# Patient Record
Sex: FEMALE | Race: WHITE | NOT HISPANIC OR LATINO | Employment: OTHER | URBAN - METROPOLITAN AREA
[De-identification: names, ages, dates, MRNs, and addresses within clinical notes are randomized per-mention and may not be internally consistent; named-entity substitution may affect disease eponyms.]

---

## 2023-06-01 ENCOUNTER — TELEPHONE (OUTPATIENT)
Dept: PRIMARY CARE | Facility: CLINIC | Age: 83
End: 2023-06-01

## 2023-06-01 DIAGNOSIS — E05.90 HYPERTHYROIDISM: ICD-10-CM

## 2023-06-01 RX ORDER — LEVOTHYROXINE SODIUM 75 UG/1
1 TABLET ORAL DAILY
COMMUNITY
Start: 2013-08-05

## 2023-06-01 RX ORDER — LEVOTHYROXINE SODIUM 75 UG/1
75 TABLET ORAL DAILY
Status: CANCELLED | OUTPATIENT
Start: 2023-06-01

## 2023-10-06 ENCOUNTER — TELEPHONE (OUTPATIENT)
Dept: GERIATRIC MEDICINE | Facility: CLINIC | Age: 83
End: 2023-10-06

## 2023-10-06 NOTE — TELEPHONE ENCOUNTER
Son states there is no signs of improvement and he would like a call back to discuss. He states Pt was seen several weeks ago.

## 2023-10-09 ENCOUNTER — TELEPHONE (OUTPATIENT)
Dept: GERIATRIC MEDICINE | Facility: CLINIC | Age: 83
End: 2023-10-09

## 2023-10-13 ENCOUNTER — TELEPHONE (OUTPATIENT)
Dept: GERIATRIC MEDICINE | Facility: CLINIC | Age: 83
End: 2023-10-13

## 2023-10-13 NOTE — TELEPHONE ENCOUNTER
Son states there was a medication that was recommended at the last visit and he is unsure of the name. Requesting to have the script sent to [facility] and he states she has not been able to contact Dr. Sauer for a discussion and would like a call back.

## 2023-10-16 ENCOUNTER — TELEPHONE (OUTPATIENT)
Dept: GERIATRIC MEDICINE | Facility: CLINIC | Age: 83
End: 2023-10-16

## 2023-10-16 NOTE — TELEPHONE ENCOUNTER
Spoke with Pt's son and he is inquiring if seroquel was sent to the pharmacy. Son states Pt never picked up the script and per chart script for seroquel was sent to [facility]. Son to call the pharmacy and check the status .

## 2024-01-02 ENCOUNTER — OFFICE VISIT (OUTPATIENT)
Dept: NEUROLOGY | Facility: CLINIC | Age: 84
End: 2024-01-02
Payer: MEDICARE

## 2024-01-02 VITALS
WEIGHT: 155.7 LBS | TEMPERATURE: 98.7 F | BODY MASS INDEX: 23.06 KG/M2 | SYSTOLIC BLOOD PRESSURE: 139 MMHG | HEIGHT: 69 IN | RESPIRATION RATE: 18 BRPM | HEART RATE: 80 BPM | DIASTOLIC BLOOD PRESSURE: 82 MMHG

## 2024-01-02 DIAGNOSIS — G30.1 LATE ONSET ALZHEIMER'S DISEASE WITHOUT BEHAVIORAL DISTURBANCE (MULTI): Primary | ICD-10-CM

## 2024-01-02 DIAGNOSIS — F02.80 LATE ONSET ALZHEIMER'S DISEASE WITHOUT BEHAVIORAL DISTURBANCE (MULTI): Primary | ICD-10-CM

## 2024-01-02 PROCEDURE — 1159F MED LIST DOCD IN RCRD: CPT | Performed by: PSYCHIATRY & NEUROLOGY

## 2024-01-02 PROCEDURE — 1036F TOBACCO NON-USER: CPT | Performed by: PSYCHIATRY & NEUROLOGY

## 2024-01-02 PROCEDURE — 1126F AMNT PAIN NOTED NONE PRSNT: CPT | Performed by: PSYCHIATRY & NEUROLOGY

## 2024-01-02 PROCEDURE — 99215 OFFICE O/P EST HI 40 MIN: CPT | Performed by: PSYCHIATRY & NEUROLOGY

## 2024-01-02 ASSESSMENT — ENCOUNTER SYMPTOMS
DEPRESSION: 0
LOSS OF SENSATION IN FEET: 0
OCCASIONAL FEELINGS OF UNSTEADINESS: 0

## 2024-01-02 ASSESSMENT — PATIENT HEALTH QUESTIONNAIRE - PHQ9
2. FEELING DOWN, DEPRESSED OR HOPELESS: NOT AT ALL
SUM OF ALL RESPONSES TO PHQ9 QUESTIONS 1 AND 2: 0
1. LITTLE INTEREST OR PLEASURE IN DOING THINGS: NOT AT ALL

## 2024-01-02 ASSESSMENT — PAIN SCALES - GENERAL: PAINLEVEL: 0-NO PAIN

## 2024-01-02 NOTE — PROGRESS NOTES
[place], [place]      Department of Neurology  [facility]     FU1 Consultation    Dr. Jovita Krishnamurthy       2024  Re: Latonia Carrillo   : 1940  MRN [id number]    CC: 82yo woman in care for cognitive decline.  Info from patient and EMR.   HPI:  :  One son noted gradual devel of memory issues.  [place] husb in [place] w/ [place].   Fell earlier this year w/ split lip w/o LOC.  :  “I think I am doing fine, thank you.   : “I'm doing fine.”  Walks & used to swim.  Eats well, sleep.  “My mood is always good.” Son tells me that he and his brothers notice: they now do the bills, HHAs c/o arguments & fights. :  is in nearby [facility] post-op and she drives to see him 1-2/wk.  She is still resistant to moving, using a cane, and driving eval.  I made the latter the focus of today's discussion.  Med Hx   Allergies: PCNs  Rx:   [id number],    [id number],  [id number]  H/O: recent fall w/ split lip  S/P: tubal ligation, OU cataractx   FHx: Dad ester RODRÍGUEZ; Mom pancCA,  bro & sis well,  5 sons  PSHx: “little college”   x65y, in apt, retired, sleeps well still drives little exercise  ROS: Skin-Skel nl Cardio-pulm nl  U/L-GI nl     Neuro:  rare mild LBP, no neck pain, no HAs; w/o CVA, TIA, TMB, VBI   Exam: EZ=006/82, HR=80, RR=18, sw=167.7  Genl:  lungs w/ clear w/ good air mvmt,   RRR w/o MRG,     full carotids w/o T/B.     Abdo soft +BS no bruit w/o dependent edema  Neuro  MS: readily agitated by son's comments about her decline     no hallucins/delus   discuss re MRI & pt was emphatic & loud, regained composure after changed subject   CN:  FEOM w/o ptosis  face symm S&E  hearing decr AU   Motor: strong & steady trace genl paratonia Rt > Lt   UE>LE~Axial   w/o invol mvmts MSRs: +1 throughout (Rt BJ more active today, ?co-occur w/ LBP) no Fortune's or grasp    Sens:  symm face, hands, legs  did not Romberg,   EC station stable w/ sway ~1cm       Coordin: FT fast w/ reg pace bilat    FNF accurate & symm to fixed target        Gait: slow careful pace, short stride, reg foot placement,  not armswing did not test     unstable tandem forward again after a 2nd attempt, and again with missteps  Cognitive:  RH: Language recept: answers & follows   express: 3-5word phrases    Fluent w/o paraphasias repeat:  complete wnl pace    Praxis:  intrans: hand shapes=2/3    transitive:  pen twirling bilat wnl    Mem: spatial: room topog=2/2   verbal: natl holidays=3/3       Exec:  Stroop names=9/9 colors=9/9    calculate=6/9   Prev MoCA=19/30  c/w mild impairment   GDS=1/15  not suggest mood disorder  Labs (to 3-Nov-2021):  wbc=5.7,   hct=40.6,    oyw=808   glu=87, bun/crt=27/13, GFR>60,     AST=14  ALT=16  AlkP=52   TSH=3.34  ohlf=914, hdl=64.7, ldl=96, chol/hdl=2.7 ldl=96,  vldl=14  TG=68  P77=956,  KYC=697  UA (27-Jul2021): lyr=935, ccg=870, bact+1  pro=30,+1   bld=mod,+2, kathryn=small,+1;     eColi resist: amp/Cef  Neuropsych (28-Jun-2022, Anand Gonzales): notable for poor WM, semantic fluency, visuopercept, design copy.  Poor word list memory w/ normal meaning and recognition, delayed recall impaired w/ notable difficulty on memory and semantic fluency c/w incipient neurodegenerative process.  Note from pts' Son (1-2-2024): Dr. Sauer - Our follow-up appointment is Tuesday 1/2 at 4:30.  I will bring my mom.  I'd like to provide background by email from my brothers and I so I do not get in the way during the appointment.  If you need to speak with me prior to the appointment, please call me at [phone].  Thank you, Palomo   Updates:  My father is no longer living at home.  He was moved to long-term care out at [facility] about 2 months ago. This has relieved a lot of the physical and emotional demands on my mom (and is far safer for my dad).  Medications: She has been filling the medication you prescribed at the last visit.  However, I do not know if she is still  filling and taking the one you suggested she stop taking (trazadone?) Observations:  She takes care of herself, lives on her own and has the condo generally clean all the time.  Memory Issues: 1) We have canceled, or hidden or directly manage all but a few monthly bills.  Of those she still receives at the home are almost always past due.  We collect them periodically and pay them.  She never asks what happened to them. 2) When I talk with her by phone, she often speaks as if Dad is still home and everything is normal, e.g. “we were home all day”.  3) She claims to be so busy all the time, when in fact she has nothing going on, and when pressed for details she can't come up with any.  4) Having a difficult time with directions. We had to go find her at a gas station one day when she was supposed to meet us at [facility] - even though it is right down the street from places she is very familiar with ([facility]) 5) Her license plates are past due by 1 year.  She keeps promising to get to the [facility] to resolve.  However, she ends up at the car dealership where she bought the car. They have told her 2 or 3 times she needs to go to the [facility].  6) When dad was home, she would not have the capacity to plan ahead for meals. 7) Anything on her mental task list is lost the next moment, let alone the next day.  8)  She sometimes is not certain what month it is.  9) She went to [employer] phone [employer] three times in one week trying to change my dad's phone service.  Each time after walking in store she was unsure of why she was there.  Even when we had given her the phone with written instructions. 10) Lost years of memory (why they moved out of the house, why they are in their financial predicament, why her sister is estranged, etc.)  11) She will dig through her purse endlessly and aimlessly.  12) Seems to get agitated with strangers.  13) Forgets details of who visited her and who she spoke with.  14)  Recently at condo I asked her to turn down volume and she attempted to do it on house phone. I thought she was just confused about device so I pointed it out but she said that she could do it on the phone (she could not).  Palomo Carrillo, [employer] through [employer]  [phone].  Response: Dear Mr. Carrillo,  These occurrences are consistent with dementia. Have you established a medical power of ?  Clearly she needs continual supervision.  Have you contacted an eldercare  to settle matters?  Dr. Sauer  A & P: Summ: Pt not recognizing cognitive difficulties, sons' notes detail increasing difficulties w/ HHA.  Genl exam unremarkable.  Neuro exam readily calmed to rational discussion.  She was most amenable to driving eval and I have scheduled that with f/u in 3 months to review results.  We had a substantial discussion re SLF. And I suspect she is aware of her precarious (gait & affect) but no agitation today.  Mild genl paratonia persists and contributes to her gait instability.  Cognitive func, elan speech, is better today.  Dx c/w neurodegn dementia.  Prev testing with MoCA=19/30, GDS=1/15.  Neuropsych. eval. as impaired working memory, semantic fluency, visuoperception, and design copy.  Labs unremarkable but for UTI last year.  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  Plan: Testing may ppt move and allow us to use risperdal0.5, but Seroquel ER25 might be needed.  EKG and Brain MRI would be approp elan w/ concern of her sons.  Today's discussion about driving more accepted and eval sched.  F/U:  Safety suggestion focused on gait (use a cane) and driving (only daytime, in good weather & feeling well).  I encouraged interim contact for issues and 3 month follow-up.  Son knows how to reach me.   Thank you for allowing me to participate in the care of your patient.   Sincerely yours, Sincere Sauer,  M.D., Ph.D., Professor of Neurology

## 2024-03-25 PROBLEM — M54.50 LOW BACK PAIN, UNSPECIFIED: Status: ACTIVE | Noted: 2023-09-15

## 2024-03-25 PROBLEM — N39.0 URINARY TRACT INFECTION: Status: ACTIVE | Noted: 2024-03-25

## 2024-03-25 PROBLEM — F51.01 PRIMARY INSOMNIA: Status: ACTIVE | Noted: 2023-06-21

## 2024-03-25 PROBLEM — R41.89 IMPAIRED COGNITION: Status: ACTIVE | Noted: 2024-03-25

## 2024-03-25 PROBLEM — M25.552 PAIN OF LEFT HIP: Status: ACTIVE | Noted: 2024-03-25

## 2024-03-25 PROBLEM — E78.00 PURE HYPERCHOLESTEROLEMIA: Status: ACTIVE | Noted: 2023-06-21

## 2024-03-25 PROBLEM — E78.01: Status: ACTIVE | Noted: 2024-03-25

## 2024-03-25 PROBLEM — Z78.0 POSTMENOPAUSAL: Status: ACTIVE | Noted: 2024-03-25

## 2024-03-25 PROBLEM — M25.559 ARTHRALGIA OF HIP: Status: ACTIVE | Noted: 2024-03-25

## 2024-03-25 PROBLEM — L29.9 ITCHING: Status: ACTIVE | Noted: 2024-03-25

## 2024-03-25 PROBLEM — F03.90 SENILE DEMENTIA (MULTI): Status: ACTIVE | Noted: 2022-09-29

## 2024-03-25 PROBLEM — M54.40 LOW BACK PAIN WITH SCIATICA: Status: ACTIVE | Noted: 2024-03-25

## 2024-03-25 PROBLEM — G47.00 INSOMNIA: Status: ACTIVE | Noted: 2024-03-25

## 2024-03-25 PROBLEM — G31.84 MCI (MILD COGNITIVE IMPAIRMENT): Status: ACTIVE | Noted: 2023-06-21

## 2024-03-25 PROBLEM — R45.1 RESTLESSNESS AND AGITATION: Status: ACTIVE | Noted: 2022-09-29

## 2024-03-25 PROBLEM — F02.B11: Status: ACTIVE | Noted: 2023-09-15

## 2024-03-25 PROBLEM — J06.9 VIRAL UPPER RESPIRATORY INFECTION: Status: ACTIVE | Noted: 2024-03-25

## 2024-03-25 PROBLEM — R41.3 MEMORY LOSS: Status: ACTIVE | Noted: 2024-03-25

## 2024-03-25 RX ORDER — TRAZODONE HYDROCHLORIDE 50 MG/1
TABLET ORAL
COMMUNITY
Start: 2022-04-11

## 2024-03-25 RX ORDER — NITROFURANTOIN 25; 75 MG/1; MG/1
CAPSULE ORAL EVERY 12 HOURS
COMMUNITY
Start: 2021-03-09

## 2024-03-25 RX ORDER — FLAXSEED OIL 1000 MG
CAPSULE ORAL
COMMUNITY
Start: 2007-03-07

## 2024-03-25 RX ORDER — DIAZEPAM 5 MG/1
TABLET ORAL EVERY 12 HOURS
COMMUNITY
Start: 2017-10-16

## 2024-03-25 RX ORDER — ZALEPLON 10 MG/1
CAPSULE ORAL
COMMUNITY

## 2024-03-25 RX ORDER — BUSPIRONE HYDROCHLORIDE 5 MG/1
TABLET ORAL
COMMUNITY
Start: 2021-12-27

## 2024-03-25 RX ORDER — TRIAMCINOLONE ACETONIDE 1 MG/G
CREAM TOPICAL
COMMUNITY
Start: 2017-01-24

## 2024-03-25 RX ORDER — MULTIVITAMIN
TABLET ORAL
COMMUNITY
Start: 2007-03-07

## 2024-03-25 RX ORDER — QUETIAPINE FUMARATE 25 MG/1
25 TABLET, FILM COATED ORAL NIGHTLY
COMMUNITY

## 2024-03-25 RX ORDER — SIMVASTATIN 40 MG/1
40 TABLET, FILM COATED ORAL
COMMUNITY
Start: 2023-11-27

## 2024-04-05 ENCOUNTER — OFFICE VISIT (OUTPATIENT)
Dept: NEUROLOGY | Facility: CLINIC | Age: 84
End: 2024-04-05
Payer: MEDICARE

## 2024-04-05 DIAGNOSIS — F02.80 LATE ONSET ALZHEIMER'S DISEASE WITHOUT BEHAVIORAL DISTURBANCE (MULTI): Primary | ICD-10-CM

## 2024-04-05 DIAGNOSIS — G30.1 LATE ONSET ALZHEIMER'S DISEASE WITHOUT BEHAVIORAL DISTURBANCE (MULTI): Primary | ICD-10-CM

## 2024-04-05 PROCEDURE — 99215 OFFICE O/P EST HI 40 MIN: CPT | Performed by: PSYCHIATRY & NEUROLOGY

## 2024-04-05 PROCEDURE — 1159F MED LIST DOCD IN RCRD: CPT | Performed by: PSYCHIATRY & NEUROLOGY

## 2024-04-05 NOTE — PROGRESS NOTES
[place], [place]      Department of Neurology  [facility]     FU1 Consultation    PCP: Dr. Jovita Krishnamurthy       2024  Re:  Latonia Carrillo    : 1940  MRN [id number]    CC: 83yo woman w/ cognit/behav issues.  Info pt, son (Palomo). and EMR.  HPI:  2018:  Pt reported seeing cult members out to kill her after her NSTEMI.  She sometimes strikes  in bed in such  dreams.  Sometimes awakens to save her 's sleep, and demon/cult people follow her into the next room where she may fall asleep again. She later tells me that these things start-out a like a large insect and then transform into people, and then into a ceiling fan shape. “They are trying to make me talk about me.” Xanax helped the 1st day, but not after that, now off Xanax she is taking unisol.  When they persist in wakefulness she pushes them away with her hands and they are gone.  She has gone to bed w/ a knife to protect herself and her .  : She still cleans the house and does the laundry and meals.  : Increasing word-finding difficulty.  Increasing gait instability and memory issues emerging.  There is also some episodic reversion to Slovenian, her primary language.  : Now doing better with addition of Haldol, still seeing evil people but less activated of fearful.  Son notices her memory for names, words, and places.  She does not notices problem. Sleeps 12m to?, no naps.  Med Hx  Allergies: NKAD  Rx:   [id number], [id number], buspar5, gwvllspvq51, xieboq71,  luafvpfht55.    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;  not driving, nightmares EtOH:  3/wk     ROS: w/o Skin-Skel nl Cardio-pulm nl   U/L-GI upset stomach/diarrhea  Neuro:  w/o LBP nl, w/o neck pain wnl; some HAs (new to her)  w/o CVA, TIA, TMB,  VBI   Exam: TB=757/80, HR=68, RR=16, ba=723,55  General:   Lungs w/ clear,    RRR w/o MRG,   full carotids w/o T/B        Abdo soft +BS no bruit       Extrems w/o depend edema trace bilat ankles  Neuro MS: Affect:  provoked by discussion of cane/coffee;  no hallucins, delusions, or agitation   CN: Vision: FEOM H&V w/o nystag or diplopia   w/o ptosis;  Face: S&E intact  Motor: strong w/o UE drift; wnl tone throughout    w/o invol mvmts    MSRs:  +1-trace absent ankles w/o clonus or spread     Release:  no Fortune's/grasp/PMR  Sens:  symm LT, cold, and vib face > hands > ankles      -Romberg,  symm sway <1cm     Coordin: FT fast w/ reg pace bilat     FNF accurate & symm to fixed target     Gait: nl pace, stride, armswing  wnl turning in 3.0 steps,   stable tandem forward   Cognitive:  fully RH    Language:  fluent, not effortful w/o paraphasias    recept:  answers & follows instrucs wnl       express: phrasesx3-5words   more full sents than before:     word-finding w/o pauses  Prev Testing MoCA=19/30, c/w significant impairment  Labs (to 12-4-2023):  wbc=10.2,    hct=40.4,    ujx=532   msp=001, bun/crt=15/0.78,  GFR=75,     AST=21  ALT=9  LvoE=204  A1c=5.7  yypc=730, hdl=55.4, ldl=73, chol/hdl=2.5, vldl=12, TG=61     TSH=     fT4=   B1=     P02=055,  Fol=,  D3=41  EKG (2-): Sinus morris=52 XJiQ=039 Abnls: nsST-T abnls  Brain MRI (9-):  Age related diffuse cerebral volume loss and findings of 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  Neuropsych (Christian, 2022): Eval shows weaknesses in working memory, semantic fluency, motor-free visuoperception, and complex design copy. Verbal memory testing with  impaired performance; she improved with structure of stories, showing leaning and recog, but delayed free recall still impaired. In visual modality, learning and free recall were impaired and she ostensibly benefited from the recognition format. Ms. Carrillo shows cognitive impairment with few cognitive risk factors. Her cerebrovascular burden appears relatively low and she has anxiety.   HSAT (3/7/2024): Dx severe sleep apnea ?Cheyne-Chavis; Recc CPAP; FU Titrat  Stop EtOH  A&P: History: 4y/o memory and language decline w/ no more nocturnal halluc as interpret of transient wakeful perceptual distortion subsiding during day during housework and food prep.  Genl exam unremarkable.  Neuro exam s/p catx OU, distal hyporeflexia.  Cognitive exam with mild language difficulties, doing better today.  Prev MoCA=19/30 c/w mild impairment.  Labs unremarkable  Prev EKG sinus morris=52 now HR=82.  Brain MRI w/ bilat gyral and hippo atrophy w/ mild, occi-par sml vsl WM dis.  Interpret: Pt w/ childhood somnambulism and progressive cognitive impairment.  Dreams still persist into awakefulness but are not longer frighten and clearly less disruptive of sleep and daytime activity.  I do not see this as part of LBD.  Word-finding diffic.  Plan:  She is doing w/ some memory lapses related by son who is an excellent and supportive observer.  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  Re-eval at that time should guid further Tx.  F/U:  I discussed these matters with the pt and her son.   I will arrange RTC in 3 months & encouraged contact for issues arising.   Thank you for allowing me to participate in your care.   Sincerely yours, Sincere Sauer M.D., Ph.D.

## 2024-04-05 NOTE — PROGRESS NOTES
[place], [place]      Department of Neurology  [facility]     FU1 Consultation    PCP: Dr. Jovita Krishnamurthy       2024  Re:  Latonia Carrillo    : 1940  MRN [id number]    CC: 85yo woman w/ cognit/behav issues.  Info pt, bernard (Darcie, POA), gdau (school) and EMR.  HPI:  2018:  Pt reports seeing cult members who are out to kill her starting about a year after her NSTEMI.  She sometimes strikes  in bed when she is having such a dream.   Sometimes she awakens to save her 's sleep, and the demon/cult people will follow her into the next room where she may fall asleep again. She later tells me that these things start-out a like a large insect and then transform into people, and then into a ceiling fan shape. “They are trying to make me talk about me.” Xanax helped the 1st day, but not after that, now off Xanax she is taking unisol.  When they persist in wakefulness she pushes them away with her hands and they are gone.  She has gone to bed w/ a knife to protect herself and her .  : She still cleans the house and does the laundry and meals.  : Increasing word-finding difficulty.  Increasing gait instability and memory issues emerging.  There is also some episodic reversion to Egyptian, her primary language.  : She is now accepting, SLF w/ husb.  Son notices her memory for names, words, and places.  She does not notices problem. Sleeps 12m to?, no naps.  Med Hx  Allergies: NKAD  Rx:   [id number],      haldol0.5bid,       rnutufytsp97, amlodipine5, hvrmcoad824,   fklqwsohztot53,   zantac,    ketorolacOU    ranitidine “oral”  H/O: NSTEMI (), CAD, HLD, HTN, MVdis, AoS, IschDilCardiomy, Raynauds   hearing impAU,   face mass, sinusitis, conjunctivitis, anemia, malnutrit   anxiety, phobia insomnia;  (nightMD luciano fear),   BMI=19.9 or less, adult  S/P: coronary angioplastybilat cataractx  Implants: cardiac stent Trauma: fell w/  LOC x 5mins, lac w/ 12 staples  FHx: Dad d83;  Mom d83 dementia,  sis w/ DLBD,  bro dPD; 1 bernard  PSHx: husb sev ARMD; [employer]; grad HS; 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;  not driving, nightmares EtOH:  3/wk     ROS: w/o Skin-Skel nl Cardio-pulm nl   U/L-GI upset stomach/diarrhea  Neuro:  w/o LBP nl, w/o neck pain wnl; some HAs (new to her)  w/o CVA, TIA, TMB,  VBI   Exam: FP=138/80, HR=68, RR=16, uz=419.5  General:   Lungs w/ clear,    RRR w/o MRG,   full carotids w/o T/B        Abdo soft +BS no bruit       Extrems w/o depend edema trace bilat ankles  Neuro MS: Affect:  provoked by discussion of cane/coffee;  no hallucins, delusions, or agitation   CN: Vision: FEOM H&V w/o nystag or diplopia   w/o ptosis;  Face: S&E intact  Motor: strong w/o UE drift; wnl tone throughout    w/o invol mvmts    MSRs:  +1-trace absent ankles w/o clonus or spread     Release:  no Fortune's/grasp/PMR  Sens:  symm LT, cold, and vib face > hands > ankles      -Romberg,  symm sway <1cm     Coordin: FT fast w/ reg pace bilat     FNF accurate & symm to fixed target     Gait: nl pace, stride, armswing  wnl turning in 3.0 steps,   stable tandem forward   Cognitive:  fully RH    Language:  primary: FrenchàAmerican English    recept:  answers & follows instrucs wnl       express: phrasesx3-5words    rare full sents:     word-finding w/o pauses   w/o paraphasias   repeat:  complete w nl pace naming:  body parts=3/3   objects=5/5       Exec:  Color-name Stroop: names=0/6 errors   colors= 3/6 errors  Prev Testing MoCA=19/30, c/w significant impairment  Labs (to 12-4-2023):  wbc=10.2,    hct=40.4,    ukp=574   apt=918, bun/crt=15/0.78,  GFR=75,     AST=21  ALT=9  GpcY=260  A1c=5.7  clmk=882, hdl=55.4, ldl=73, chol/hdl=2.5, vldl=12, TG=61     TSH=     fT4=   B1=     I95=241,  Fol=,  D3=41  EKG (2-): Sinus morris=52 PBaY=162 Abnls: nsST-T abnls  Brain MRI (9-):  Age related diffuse cerebral volume loss and findings of 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  HSAT (3/7/2024): Dx severe sleep apnea ?Cheyne-Chavis; Recc CPAP; FU Titrat  Stop EtOH  A&P: History: 6y/o memory and language decline w/ no more nocturnal halluc as interpret of transient wakeful perceptual distortion subsiding during day during housework and food prep.  Genl exam unremarkable.  Neuro exam s/p catx OU, distal hyporeflexia.  Cognitive exam with mild language difficulties, hard to discern in the context of Niuean as her primary language, poor motor seq learning blat.  Prev MoCA=19/30 c/w mild impairment.  Labs unremarkable but for A1c=5.7 and EKG sinus morris=52.  Brain MRI w/ bilat gyral and hippo atrophy w/ mild, occi-par sml vsl WM dis.  Interpret: Pt w/ childhood somnambulism and progressive cognitive impairment.  Dreams occass persist into the awake variably frightening and clearly disruptive of subseq sleep.  Some hypnpom/gognic hallucinosis, no support for LBD w/o other LBD sxs.  Word-finding diffic.  Plan:  She is doing well but for memory lapses related by son and seen here today.  I will stop and Xanax as sleep issues subsided w/o reg compliance, and add Memantine as 5 bid (hs only for 1st month.   F/U:  I discussed these matters with the pt and her daughter.   I will arrange RTC in 3 months & encouraged contact for issues arising.   Thank you for allowing me to participate in your care.   Sincerely yours, Sincere Sauer M.D., Ph.D.

## 2024-08-30 ENCOUNTER — APPOINTMENT (OUTPATIENT)
Dept: NEUROLOGY | Facility: CLINIC | Age: 84
End: 2024-08-30
Payer: MEDICARE

## 2024-08-30 ENCOUNTER — TELEPHONE (OUTPATIENT)
Dept: GERIATRIC MEDICINE | Facility: CLINIC | Age: 84
End: 2024-08-30
Payer: MEDICARE

## 2024-08-30 NOTE — TELEPHONE ENCOUNTER
Patients son Palomo is calling regarding patients medication. States patient has moved care facilities and he is unsure if she is getting all of her medications. Return phone call placed to son and  message left to return call.

## 2024-09-09 NOTE — TELEPHONE ENCOUNTER
Spoke with son Palomo regarding sending a med list to [facility]. Looking to have medications reconciled. Med list faxed to [phone].

## 2024-12-05 ENCOUNTER — LAB REQUISITION (OUTPATIENT)
Dept: LAB | Facility: HOSPITAL | Age: 84
End: 2024-12-05
Payer: MEDICARE

## 2024-12-05 DIAGNOSIS — E11.9 TYPE 2 DIABETES MELLITUS WITHOUT COMPLICATIONS (MULTI): ICD-10-CM

## 2024-12-05 DIAGNOSIS — E56.9 VITAMIN DEFICIENCY, UNSPECIFIED: ICD-10-CM

## 2024-12-05 DIAGNOSIS — E03.9 HYPOTHYROIDISM, UNSPECIFIED: ICD-10-CM

## 2024-12-05 DIAGNOSIS — E78.5 HYPERLIPIDEMIA, UNSPECIFIED: ICD-10-CM

## 2024-12-05 DIAGNOSIS — I10 ESSENTIAL (PRIMARY) HYPERTENSION: ICD-10-CM

## 2024-12-05 LAB
ALBUMIN SERPL BCP-MCNC: 3.4 G/DL (ref 3.4–5)
ALP SERPL-CCNC: 64 U/L (ref 33–136)
ALT SERPL W P-5'-P-CCNC: 18 U/L (ref 7–45)
ANION GAP SERPL CALC-SCNC: 15 MMOL/L (ref 10–20)
AST SERPL W P-5'-P-CCNC: 19 U/L (ref 9–39)
BILIRUB SERPL-MCNC: 0.4 MG/DL (ref 0–1.2)
BUN SERPL-MCNC: 18 MG/DL (ref 6–23)
CALCIUM SERPL-MCNC: 8.9 MG/DL (ref 8.6–10.3)
CHLORIDE SERPL-SCNC: 104 MMOL/L (ref 98–107)
CHOLEST SERPL-MCNC: 133 MG/DL (ref 0–199)
CHOLESTEROL/HDL RATIO: 2.8
CO2 SERPL-SCNC: 28 MMOL/L (ref 21–32)
CREAT SERPL-MCNC: 0.73 MG/DL (ref 0.5–1.05)
EGFRCR SERPLBLD CKD-EPI 2021: 81 ML/MIN/1.73M*2
ERYTHROCYTE [DISTWIDTH] IN BLOOD BY AUTOMATED COUNT: 13.6 % (ref 11.5–14.5)
GLUCOSE SERPL-MCNC: 73 MG/DL (ref 74–99)
HCT VFR BLD AUTO: 35.1 % (ref 36–46)
HDLC SERPL-MCNC: 47.1 MG/DL
HGB BLD-MCNC: 11 G/DL (ref 12–16)
LDLC SERPL CALC-MCNC: 76 MG/DL
MCH RBC QN AUTO: 31.3 PG (ref 26–34)
MCHC RBC AUTO-ENTMCNC: 31.3 G/DL (ref 32–36)
MCV RBC AUTO: 100 FL (ref 80–100)
NON HDL CHOLESTEROL: 86 MG/DL (ref 0–149)
NRBC BLD-RTO: 0 /100 WBCS (ref 0–0)
PLATELET # BLD AUTO: 183 X10*3/UL (ref 150–450)
POTASSIUM SERPL-SCNC: 3.9 MMOL/L (ref 3.5–5.3)
PROT SERPL-MCNC: 5.8 G/DL (ref 6.4–8.2)
RBC # BLD AUTO: 3.51 X10*6/UL (ref 4–5.2)
SODIUM SERPL-SCNC: 143 MMOL/L (ref 136–145)
TRIGL SERPL-MCNC: 50 MG/DL (ref 0–149)
TSH SERPL-ACNC: 5.5 MIU/L (ref 0.44–3.98)
VLDL: 10 MG/DL (ref 0–40)
WBC # BLD AUTO: 5.1 X10*3/UL (ref 4.4–11.3)

## 2024-12-05 PROCEDURE — 36415 COLL VENOUS BLD VENIPUNCTURE: CPT | Mod: OUT | Performed by: NURSE PRACTITIONER

## 2024-12-05 PROCEDURE — 80061 LIPID PANEL: CPT | Mod: OUT | Performed by: NURSE PRACTITIONER

## 2024-12-05 PROCEDURE — 80053 COMPREHEN METABOLIC PANEL: CPT | Mod: OUT | Performed by: NURSE PRACTITIONER

## 2024-12-05 PROCEDURE — 83036 HEMOGLOBIN GLYCOSYLATED A1C: CPT | Mod: OUT,GEALAB | Performed by: NURSE PRACTITIONER

## 2024-12-05 PROCEDURE — 82306 VITAMIN D 25 HYDROXY: CPT | Mod: OUT,GEALAB | Performed by: NURSE PRACTITIONER

## 2024-12-05 PROCEDURE — 84443 ASSAY THYROID STIM HORMONE: CPT | Mod: OUT | Performed by: NURSE PRACTITIONER

## 2024-12-05 PROCEDURE — 85027 COMPLETE CBC AUTOMATED: CPT | Mod: OUT | Performed by: NURSE PRACTITIONER

## 2024-12-06 LAB
25(OH)D3 SERPL-MCNC: 112 NG/ML (ref 30–100)
EST. AVERAGE GLUCOSE BLD GHB EST-MCNC: 94 MG/DL
HBA1C MFR BLD: 4.9 %

## 2025-03-04 PROCEDURE — 87086 URINE CULTURE/COLONY COUNT: CPT | Mod: OUT,GEALAB | Performed by: NURSE PRACTITIONER

## 2025-03-04 PROCEDURE — 81001 URINALYSIS AUTO W/SCOPE: CPT | Mod: OUT | Performed by: NURSE PRACTITIONER

## 2025-03-05 ENCOUNTER — LAB REQUISITION (OUTPATIENT)
Dept: LAB | Facility: HOSPITAL | Age: 85
End: 2025-03-05
Payer: COMMERCIAL

## 2025-03-05 DIAGNOSIS — R41.82 ALTERED MENTAL STATUS, UNSPECIFIED: ICD-10-CM

## 2025-03-05 DIAGNOSIS — R32 UNSPECIFIED URINARY INCONTINENCE: ICD-10-CM

## 2025-03-05 LAB
APPEARANCE UR: ABNORMAL
BACTERIA #/AREA URNS AUTO: ABNORMAL /HPF
BILIRUB UR STRIP.AUTO-MCNC: NEGATIVE MG/DL
CAOX CRY #/AREA UR COMP ASSIST: ABNORMAL /HPF
COLOR UR: YELLOW
GLUCOSE UR STRIP.AUTO-MCNC: NORMAL MG/DL
HYALINE CASTS #/AREA URNS AUTO: ABNORMAL /LPF
KETONES UR STRIP.AUTO-MCNC: NEGATIVE MG/DL
LEUKOCYTE ESTERASE UR QL STRIP.AUTO: ABNORMAL
MUCOUS THREADS #/AREA URNS AUTO: ABNORMAL /LPF
NITRITE UR QL STRIP.AUTO: ABNORMAL
PH UR STRIP.AUTO: 5.5 [PH]
PROT UR STRIP.AUTO-MCNC: ABNORMAL MG/DL
RBC # UR STRIP.AUTO: NEGATIVE MG/DL
RBC #/AREA URNS AUTO: ABNORMAL /HPF
SP GR UR STRIP.AUTO: 1.03
SQUAMOUS #/AREA URNS AUTO: ABNORMAL /HPF
UROBILINOGEN UR STRIP.AUTO-MCNC: NORMAL MG/DL
WBC #/AREA URNS AUTO: ABNORMAL /HPF
YEAST BUDDING #/AREA UR COMP ASSIST: PRESENT /HPF

## 2025-03-07 LAB — BACTERIA UR CULT: ABNORMAL

## 2025-03-27 ENCOUNTER — LAB REQUISITION (OUTPATIENT)
Dept: LAB | Facility: HOSPITAL | Age: 85
End: 2025-03-27
Payer: COMMERCIAL

## 2025-03-27 DIAGNOSIS — R41.82 ALTERED MENTAL STATUS, UNSPECIFIED: ICD-10-CM

## 2025-03-27 DIAGNOSIS — R60.9 EDEMA, UNSPECIFIED: ICD-10-CM

## 2025-03-27 LAB
ANION GAP SERPL CALC-SCNC: 12 MMOL/L (ref 10–20)
BUN SERPL-MCNC: 18 MG/DL (ref 6–23)
CALCIUM SERPL-MCNC: 8.6 MG/DL (ref 8.6–10.3)
CHLORIDE SERPL-SCNC: 107 MMOL/L (ref 98–107)
CO2 SERPL-SCNC: 25 MMOL/L (ref 21–32)
CREAT SERPL-MCNC: 0.78 MG/DL (ref 0.5–1.05)
EGFRCR SERPLBLD CKD-EPI 2021: 74 ML/MIN/1.73M*2
ERYTHROCYTE [DISTWIDTH] IN BLOOD BY AUTOMATED COUNT: 13.2 % (ref 11.5–14.5)
GLUCOSE SERPL-MCNC: 80 MG/DL (ref 74–99)
HCT VFR BLD AUTO: 32.4 % (ref 36–46)
HGB BLD-MCNC: 10.6 G/DL (ref 12–16)
MCH RBC QN AUTO: 33.7 PG (ref 26–34)
MCHC RBC AUTO-ENTMCNC: 32.7 G/DL (ref 32–36)
MCV RBC AUTO: 103 FL (ref 80–100)
NRBC BLD-RTO: 0 /100 WBCS (ref 0–0)
PLATELET # BLD AUTO: 190 X10*3/UL (ref 150–450)
POTASSIUM SERPL-SCNC: 4 MMOL/L (ref 3.5–5.3)
RBC # BLD AUTO: 3.15 X10*6/UL (ref 4–5.2)
SODIUM SERPL-SCNC: 140 MMOL/L (ref 136–145)
WBC # BLD AUTO: 5.8 X10*3/UL (ref 4.4–11.3)

## 2025-03-27 PROCEDURE — 82374 ASSAY BLOOD CARBON DIOXIDE: CPT | Mod: OUT | Performed by: NURSE PRACTITIONER

## 2025-03-27 PROCEDURE — 36415 COLL VENOUS BLD VENIPUNCTURE: CPT | Mod: OUT | Performed by: NURSE PRACTITIONER

## 2025-03-27 PROCEDURE — 85027 COMPLETE CBC AUTOMATED: CPT | Mod: OUT | Performed by: NURSE PRACTITIONER

## 2025-06-06 ENCOUNTER — LAB REQUISITION (OUTPATIENT)
Dept: LAB | Facility: HOSPITAL | Age: 85
End: 2025-06-06
Payer: COMMERCIAL

## 2025-06-06 DIAGNOSIS — Z79.899 OTHER LONG TERM (CURRENT) DRUG THERAPY: ICD-10-CM

## 2025-06-06 LAB — TSH SERPL-ACNC: 10.48 MIU/L (ref 0.44–3.98)

## 2025-06-06 PROCEDURE — 84443 ASSAY THYROID STIM HORMONE: CPT | Mod: OUT | Performed by: NURSE PRACTITIONER

## 2025-06-06 PROCEDURE — 36415 COLL VENOUS BLD VENIPUNCTURE: CPT | Mod: OUT | Performed by: NURSE PRACTITIONER

## 2025-06-30 ENCOUNTER — LAB REQUISITION (OUTPATIENT)
Dept: LAB | Facility: HOSPITAL | Age: 85
End: 2025-06-30
Payer: COMMERCIAL

## 2025-06-30 DIAGNOSIS — E78.5 HYPERLIPIDEMIA, UNSPECIFIED: ICD-10-CM

## 2025-06-30 DIAGNOSIS — N18.30 CHRONIC KIDNEY DISEASE, STAGE 3 UNSPECIFIED (MULTI): ICD-10-CM

## 2025-06-30 DIAGNOSIS — E55.9 VITAMIN D DEFICIENCY, UNSPECIFIED: ICD-10-CM

## 2025-06-30 DIAGNOSIS — I10 ESSENTIAL (PRIMARY) HYPERTENSION: ICD-10-CM

## 2025-06-30 DIAGNOSIS — R53.83 OTHER FATIGUE: ICD-10-CM

## 2025-06-30 DIAGNOSIS — E11.9 TYPE 2 DIABETES MELLITUS WITHOUT COMPLICATIONS: ICD-10-CM

## 2025-06-30 LAB
25(OH)D3 SERPL-MCNC: 135 NG/ML (ref 30–100)
ANION GAP SERPL CALC-SCNC: 13 MMOL/L (ref 10–20)
BASOPHILS # BLD AUTO: 0.03 X10*3/UL (ref 0–0.1)
BASOPHILS NFR BLD AUTO: 0.5 %
BUN SERPL-MCNC: 20 MG/DL (ref 6–23)
CALCIUM SERPL-MCNC: 9.1 MG/DL (ref 8.6–10.3)
CHLORIDE SERPL-SCNC: 107 MMOL/L (ref 98–107)
CHOLEST SERPL-MCNC: 157 MG/DL (ref 0–199)
CHOLESTEROL/HDL RATIO: 3.3
CO2 SERPL-SCNC: 26 MMOL/L (ref 21–32)
CREAT SERPL-MCNC: 0.75 MG/DL (ref 0.5–1.05)
EGFRCR SERPLBLD CKD-EPI 2021: 78 ML/MIN/1.73M*2
EOSINOPHIL # BLD AUTO: 0.19 X10*3/UL (ref 0–0.4)
EOSINOPHIL NFR BLD AUTO: 3.5 %
ERYTHROCYTE [DISTWIDTH] IN BLOOD BY AUTOMATED COUNT: 13.4 % (ref 11.5–14.5)
EST. AVERAGE GLUCOSE BLD GHB EST-MCNC: 97 MG/DL
GLUCOSE SERPL-MCNC: 81 MG/DL (ref 74–99)
HBA1C MFR BLD: 5 % (ref ?–5.7)
HCT VFR BLD AUTO: 38.9 % (ref 36–46)
HDLC SERPL-MCNC: 47.5 MG/DL
HGB BLD-MCNC: 12.9 G/DL (ref 12–16)
IMM GRANULOCYTES # BLD AUTO: 0.02 X10*3/UL (ref 0–0.5)
IMM GRANULOCYTES NFR BLD AUTO: 0.4 % (ref 0–0.9)
LDLC SERPL CALC-MCNC: 94 MG/DL
LYMPHOCYTES # BLD AUTO: 1.47 X10*3/UL (ref 0.8–3)
LYMPHOCYTES NFR BLD AUTO: 26.8 %
MCH RBC QN AUTO: 33.4 PG (ref 26–34)
MCHC RBC AUTO-ENTMCNC: 33.2 G/DL (ref 32–36)
MCV RBC AUTO: 101 FL (ref 80–100)
MONOCYTES # BLD AUTO: 0.65 X10*3/UL (ref 0.05–0.8)
MONOCYTES NFR BLD AUTO: 11.8 %
NEUTROPHILS # BLD AUTO: 3.13 X10*3/UL (ref 1.6–5.5)
NEUTROPHILS NFR BLD AUTO: 57 %
NON HDL CHOLESTEROL: 110 MG/DL (ref 0–149)
NRBC BLD-RTO: 0 /100 WBCS (ref 0–0)
PLATELET # BLD AUTO: 186 X10*3/UL (ref 150–450)
POTASSIUM SERPL-SCNC: 4.4 MMOL/L (ref 3.5–5.3)
RBC # BLD AUTO: 3.86 X10*6/UL (ref 4–5.2)
SODIUM SERPL-SCNC: 142 MMOL/L (ref 136–145)
T4 FREE SERPL-MCNC: 1.08 NG/DL (ref 0.61–1.12)
TRIGL SERPL-MCNC: 80 MG/DL (ref 0–149)
TSH SERPL-ACNC: 6.45 MIU/L (ref 0.44–3.98)
VLDL: 16 MG/DL (ref 0–40)
WBC # BLD AUTO: 5.5 X10*3/UL (ref 4.4–11.3)

## 2025-06-30 PROCEDURE — 36415 COLL VENOUS BLD VENIPUNCTURE: CPT | Mod: OUT | Performed by: NURSE PRACTITIONER

## 2025-06-30 PROCEDURE — 80061 LIPID PANEL: CPT | Mod: OUT | Performed by: NURSE PRACTITIONER

## 2025-06-30 PROCEDURE — 80048 BASIC METABOLIC PNL TOTAL CA: CPT | Mod: OUT | Performed by: NURSE PRACTITIONER

## 2025-06-30 PROCEDURE — 83036 HEMOGLOBIN GLYCOSYLATED A1C: CPT | Mod: OUT,GEALAB | Performed by: NURSE PRACTITIONER

## 2025-06-30 PROCEDURE — 85025 COMPLETE CBC W/AUTO DIFF WBC: CPT | Mod: OUT | Performed by: NURSE PRACTITIONER

## 2025-06-30 PROCEDURE — 82306 VITAMIN D 25 HYDROXY: CPT | Mod: OUT,GEALAB | Performed by: NURSE PRACTITIONER

## 2025-06-30 PROCEDURE — 84439 ASSAY OF FREE THYROXINE: CPT | Mod: OUT | Performed by: NURSE PRACTITIONER

## 2025-06-30 PROCEDURE — 84481 FREE ASSAY (FT-3): CPT | Mod: OUT,GEALAB | Performed by: NURSE PRACTITIONER

## 2025-06-30 PROCEDURE — 84443 ASSAY THYROID STIM HORMONE: CPT | Mod: OUT | Performed by: NURSE PRACTITIONER

## 2025-07-01 LAB — T3FREE SERPL-MCNC: 2.7 PG/ML (ref 2.3–4.2)

## 2025-07-03 ENCOUNTER — LAB REQUISITION (OUTPATIENT)
Dept: LAB | Facility: HOSPITAL | Age: 85
End: 2025-07-03
Payer: COMMERCIAL

## 2025-07-03 DIAGNOSIS — R41.82 ALTERED MENTAL STATUS, UNSPECIFIED: ICD-10-CM

## 2025-07-03 LAB
APPEARANCE UR: CLEAR
BACTERIA #/AREA URNS AUTO: ABNORMAL /HPF
BILIRUB UR STRIP.AUTO-MCNC: NEGATIVE MG/DL
COLOR UR: ABNORMAL
GLUCOSE UR STRIP.AUTO-MCNC: NORMAL MG/DL
KETONES UR STRIP.AUTO-MCNC: NEGATIVE MG/DL
LEUKOCYTE ESTERASE UR QL STRIP.AUTO: ABNORMAL
MUCOUS THREADS #/AREA URNS AUTO: ABNORMAL /LPF
NITRITE UR QL STRIP.AUTO: ABNORMAL
PH UR STRIP.AUTO: 6 [PH]
PROT UR STRIP.AUTO-MCNC: NEGATIVE MG/DL
RBC # UR STRIP.AUTO: NEGATIVE MG/DL
RBC #/AREA URNS AUTO: ABNORMAL /HPF
SP GR UR STRIP.AUTO: 1.01
UROBILINOGEN UR STRIP.AUTO-MCNC: NORMAL MG/DL
WBC #/AREA URNS AUTO: ABNORMAL /HPF
WBC CLUMPS #/AREA URNS AUTO: ABNORMAL /HPF

## 2025-07-03 PROCEDURE — 81001 URINALYSIS AUTO W/SCOPE: CPT | Mod: OUT | Performed by: NURSE PRACTITIONER

## 2025-07-03 PROCEDURE — 87086 URINE CULTURE/COLONY COUNT: CPT | Mod: OUT,GEALAB | Performed by: NURSE PRACTITIONER

## 2025-07-06 LAB — BACTERIA UR CULT: ABNORMAL
